# Patient Record
Sex: MALE
[De-identification: names, ages, dates, MRNs, and addresses within clinical notes are randomized per-mention and may not be internally consistent; named-entity substitution may affect disease eponyms.]

---

## 2020-11-20 ENCOUNTER — APPOINTMENT (OUTPATIENT)
Dept: UROLOGY | Facility: CLINIC | Age: 45
End: 2020-11-20

## 2020-12-10 ENCOUNTER — APPOINTMENT (OUTPATIENT)
Dept: UROLOGY | Facility: CLINIC | Age: 45
End: 2020-12-10

## 2022-04-13 ENCOUNTER — APPOINTMENT (OUTPATIENT)
Dept: NEUROLOGY | Facility: CLINIC | Age: 47
End: 2022-04-13

## 2024-09-30 PROBLEM — M89.9 LESION OF THORACIC VERTEBRA: Status: ACTIVE | Noted: 2024-09-30

## 2024-09-30 NOTE — HISTORY OF PRESENT ILLNESS
[de-identified] : The patient reports back pain  MRI thoracic spine showed 8 mm T2 hyperintense soft tissue lesion in the paraspinal tissues anterior and to the left of the T6 vertebral body

## 2024-09-30 NOTE — ASSESSMENT
[FreeTextEntry1] : 49 years old man with soft tissue lesion in the paraspinal tissues anterior and to the left of the T6 vertebral body

## 2024-10-01 ENCOUNTER — APPOINTMENT (OUTPATIENT)
Dept: NEUROSURGERY | Facility: CLINIC | Age: 49
End: 2024-10-01

## 2024-10-01 DIAGNOSIS — M89.9 DISORDER OF BONE, UNSPECIFIED: ICD-10-CM

## 2024-12-17 ENCOUNTER — APPOINTMENT (OUTPATIENT)
Dept: NEUROSURGERY | Facility: CLINIC | Age: 49
End: 2024-12-17

## 2024-12-17 DIAGNOSIS — M89.9 DISORDER OF BONE, UNSPECIFIED: ICD-10-CM
